# Patient Record
Sex: FEMALE | Race: BLACK OR AFRICAN AMERICAN | URBAN - METROPOLITAN AREA
[De-identification: names, ages, dates, MRNs, and addresses within clinical notes are randomized per-mention and may not be internally consistent; named-entity substitution may affect disease eponyms.]

---

## 2018-05-07 ENCOUNTER — OFFICE VISIT (OUTPATIENT)
Dept: FAMILY MEDICINE CLINIC | Facility: CLINIC | Age: 63
End: 2018-05-07

## 2018-05-07 VITALS
SYSTOLIC BLOOD PRESSURE: 170 MMHG | BODY MASS INDEX: 33.55 KG/M2 | WEIGHT: 180 LBS | DIASTOLIC BLOOD PRESSURE: 90 MMHG | RESPIRATION RATE: 16 BRPM | HEIGHT: 61.5 IN

## 2018-05-07 DIAGNOSIS — Z76.89 ESTABLISHING CARE WITH NEW DOCTOR, ENCOUNTER FOR: Primary | ICD-10-CM

## 2018-05-07 DIAGNOSIS — L29.9 PRURITUS: ICD-10-CM

## 2018-05-07 DIAGNOSIS — I10 ESSENTIAL HYPERTENSION: ICD-10-CM

## 2018-05-07 DIAGNOSIS — G47.33 OSA ON CPAP: ICD-10-CM

## 2018-05-07 DIAGNOSIS — E78.5 HYPERLIPIDEMIA, UNSPECIFIED HYPERLIPIDEMIA TYPE: ICD-10-CM

## 2018-05-07 DIAGNOSIS — Z99.89 OSA ON CPAP: ICD-10-CM

## 2018-05-07 PROCEDURE — 99204 OFFICE O/P NEW MOD 45 MIN: CPT | Performed by: FAMILY MEDICINE

## 2018-05-07 PROCEDURE — 99212 OFFICE O/P EST SF 10 MIN: CPT | Performed by: FAMILY MEDICINE

## 2018-05-07 RX ORDER — SIMVASTATIN 20 MG
20 TABLET ORAL NIGHTLY
Refills: 1 | COMMUNITY
Start: 2018-04-06

## 2018-05-07 RX ORDER — LOSARTAN POTASSIUM AND HYDROCHLOROTHIAZIDE 12.5; 1 MG/1; MG/1
1 TABLET ORAL DAILY
COMMUNITY

## 2018-05-07 RX ORDER — METOPROLOL SUCCINATE 50 MG/1
50 TABLET, EXTENDED RELEASE ORAL
Refills: 0 | COMMUNITY
Start: 2018-04-17

## 2018-05-07 RX ORDER — POTASSIUM CHLORIDE 10 MEQ/1
10 TABLET, EXTENDED RELEASE ORAL 2 TIMES DAILY
Refills: 1 | COMMUNITY
Start: 2018-04-13

## 2018-05-07 NOTE — PROGRESS NOTES
HPI:    Patient ID: Yevan Rossi is a 58year old female.     58year old AA female here for complete preventive care physical and for status update on any confirmed chronic medical illnesses and follow up on any previous labs or procedures t Review of Systems   Respiratory: Negative for shortness of breath. Cardiovascular: Negative for chest pain, palpitations and orthopnea. Neurological: Negative for headaches.             Current Outpatient Prescriptions:  Metoprolol Succinate ER 5 goal (non compliant)    3. Hyperlipidemia, unspecified hyperlipidemia type  Status to be updated    4. Pruritus  To derm/allergy    5.  MELBA on CPAP  Needs current study  - OP [facility] REFERRAL CPAP RE-TITRATION STUDY  - GENERAL SLEEP STUDY TRANSCRIPTION    6

## 2018-05-07 NOTE — PATIENT INSTRUCTIONS
Medication reviewed and renewed where needed and appropriate. Comply with medications. Monitor blood pressures and record at home. Limit salt intake. Recommend weight loss via daily exercising and consistent healthy dietary changes.   Will likely need re

## 2018-05-10 ENCOUNTER — TELEPHONE (OUTPATIENT)
Dept: FAMILY MEDICINE CLINIC | Facility: CLINIC | Age: 63
End: 2018-05-10

## 2018-05-10 DIAGNOSIS — Z99.89 OSA ON CPAP: Primary | ICD-10-CM

## 2018-05-10 DIAGNOSIS — G47.33 OSA ON CPAP: Primary | ICD-10-CM

## 2018-05-10 NOTE — TELEPHONE ENCOUNTER
Hi Dr. Jacquelene Severe,     The order for the CPAP titration study will require significant clinical information.      In-Facility Polysomnography (PSG)-Positive Airway Pressure (PAP) Titration:  [facility] covers in-facility PSG (CPT code 22728) for PAP titration, fol polymyositis, Guillian Minier syndrome)    3)congestive heart failure (moderate to severe), NYHA Class III or IV (LVEF ? 45%)    4)obesity hypoventilation syndrome, previously documented (defined as pCO2 > 45  mmHg and pO2 < 60 mmHg on arterial blood gas)

## 2018-05-11 NOTE — TELEPHONE ENCOUNTER
Hi,    She should have her medical records sent to you for review. Specifically, records from a Pulmonologist or other Sleep provider who has been managing her MELBA.  I will submit this request, without the documentation, this will likely need a peer review

## 2018-05-29 NOTE — TELEPHONE ENCOUNTER
Hi Dr. Matias Woo. Patient's insurance has denied the sleep titration citing, criteria not met and duplicate test done at another facility.  A peer review was not giving as an other to overturn this matter, however, you do have to right to contact Care Ce

## 2018-05-29 NOTE — TELEPHONE ENCOUNTER
Please contact this patient and see if she can assist in getting a copy of her previous sleep study. Thank you.

## 2018-05-30 ENCOUNTER — TELEPHONE (OUTPATIENT)
Dept: FAMILY MEDICINE CLINIC | Facility: CLINIC | Age: 63
End: 2018-05-30

## 2018-05-30 NOTE — TELEPHONE ENCOUNTER
This request is being handled on another encounter for PA. Pt has already had a sleep study needs to have medical records sent.

## 2018-05-30 NOTE — TELEPHONE ENCOUNTER
A peer review is the only option to overturn a decision. Another request can be submitted once the previous test is received. Thank you,   Kadie Pham.    [facility]

## 2018-05-30 NOTE — TELEPHONE ENCOUNTER
Miguelina Tripp - spoke to patient who states her previous sleep study was done in 2008 and we are awaiting a copy to be faxed to our office.  Do you think that date will allow an overturn of the patient's insurance denial?

## 2018-05-30 NOTE — TELEPHONE ENCOUNTER
Pt called in stating that the sleep study office called her stating that her referral was denied.  According to the office, they are stating it was denied because it was a duplicate request. Pt requesting a call back to discuss and see what the next steps a

## 2018-06-01 NOTE — TELEPHONE ENCOUNTER
Pt [facility] sleep study center from when she had her sleep study back in 2008 should have faxed that info yesterday  Provided pt with fax   Pt will have them fax that info today

## 2018-06-26 NOTE — TELEPHONE ENCOUNTER
Dr Corinne Beebe, please advise. Patient called, insurance denied the sleep study. She asked, what to do now? Last sleep study, titration was in 2008.     Please respond to pool: [facility] OPO LPN/CMA

## 2018-06-28 NOTE — TELEPHONE ENCOUNTER
Let her know I do have a copy of her report. If I can get contact information so that I can speak with her insurance company that would possibly help.

## 2018-07-02 NOTE — TELEPHONE ENCOUNTER
Managed care what is the contact info for Dr Bri Ambrosio to use when he calls insurance to get approval of the sleep study

## 2018-07-02 NOTE — TELEPHONE ENCOUNTER
HI,    Typically, there's a window for peer review and not sure if it has . Initial request for peer review was sent 18.  However, Dr. Magdiel Escalona can attempt it by calling [facility] at [phone] (which is options 4, and 5) case reference num

## 2018-07-24 ENCOUNTER — TELEPHONE (OUTPATIENT)
Dept: OTHER | Age: 63
End: 2018-07-24

## 2018-07-24 NOTE — TELEPHONE ENCOUNTER
Dr JOHNSTON=please see message below from [facility] care, patient calling and requesting a FU regarding this matter. Contacted FELIBERTO Johnston via [facility], to check the chart and relay the message to Dr Idalia Soni.     Please reply to pool: ANDREW Angelo

## 2018-07-27 NOTE — TELEPHONE ENCOUNTER
Please fax the study to me, Heydi Funk at [phone] and I will resubmit this information for review. Thank you for the follow up.      Heydi Funk.

## 2018-07-27 NOTE — TELEPHONE ENCOUNTER
So I have a sleep study from 04/28/2008 Marky Donald and did call the number you left and found out that my request was never formally denied.  I was instructed to request the sleep study along with my clinical documentation and the sleep study result the malinda

## 2018-08-23 NOTE — TELEPHONE ENCOUNTER
Hi Dr. Matias Woo,     Patient's insurance is requesting recent CPAP downloads for patient's re-titration sleep study. I've looked through her past records and according to your OV 5/7/2018, patient is not complaint with CPAP.  Due to this, a re-titration can

## 2018-10-18 ENCOUNTER — OFFICE VISIT (OUTPATIENT)
Dept: SLEEP CENTER | Age: 63
End: 2018-10-18
Attending: FAMILY MEDICINE
Payer: COMMERCIAL

## 2018-10-18 DIAGNOSIS — Z99.89 OSA ON CPAP: ICD-10-CM

## 2018-10-18 DIAGNOSIS — G47.33 OSA ON CPAP: ICD-10-CM

## 2018-10-18 PROCEDURE — 95806 SLEEP STUDY UNATT&RESP EFFT: CPT

## 2018-10-19 ENCOUNTER — TELEPHONE (OUTPATIENT)
Dept: FAMILY MEDICINE CLINIC | Facility: CLINIC | Age: 63
End: 2018-10-19

## 2018-10-19 NOTE — TELEPHONE ENCOUNTER
Per Rebecca hackett, they received the Order but they need the copy of the Sleep Study of pt fax to [phone] Attn: Customer Service.

## 2018-10-20 NOTE — PROCEDURES
[address] [facility]  Accredited by the [employer] ([employer])    PATIENT'S NAME: Corky Shoulder   ATTENDING PHYSICIAN: Alphonso Johnston DO   REFERRING PHYSICIAN: Alphonso Johnston DO   PATIENT ACCOUNT #: [de-identified] Weight loss. 2.   Avoid alcohol. 3.   Avoid sedating drug. 4.   Patient should not drive if at all. Please do not hesitate to contact me if there is any question whatsoever regarding interpretation of this study.     Dictated By Lashawn Martinez MD